# Patient Record
Sex: MALE | Race: WHITE | NOT HISPANIC OR LATINO | Employment: FULL TIME | URBAN - METROPOLITAN AREA
[De-identification: names, ages, dates, MRNs, and addresses within clinical notes are randomized per-mention and may not be internally consistent; named-entity substitution may affect disease eponyms.]

---

## 2019-12-19 ENCOUNTER — RECORDS - HEALTHEAST (OUTPATIENT)
Dept: LAB | Facility: CLINIC | Age: 24
End: 2019-12-19

## 2019-12-19 LAB
ALBUMIN SERPL-MCNC: 4.6 G/DL (ref 3.5–5)
ALP SERPL-CCNC: 67 U/L (ref 45–120)
ALT SERPL W P-5'-P-CCNC: 26 U/L (ref 0–45)
ANION GAP SERPL CALCULATED.3IONS-SCNC: 12 MMOL/L (ref 5–18)
AST SERPL W P-5'-P-CCNC: 17 U/L (ref 0–40)
BILIRUB SERPL-MCNC: 0.4 MG/DL (ref 0–1)
BUN SERPL-MCNC: 13 MG/DL (ref 8–22)
CALCIUM SERPL-MCNC: 10 MG/DL (ref 8.5–10.5)
CHLORIDE BLD-SCNC: 99 MMOL/L (ref 98–107)
CHOLEST SERPL-MCNC: 242 MG/DL
CO2 SERPL-SCNC: 28 MMOL/L (ref 22–31)
CREAT SERPL-MCNC: 1.05 MG/DL (ref 0.7–1.3)
FASTING STATUS PATIENT QL REPORTED: ABNORMAL
GFR SERPL CREATININE-BSD FRML MDRD: >60 ML/MIN/1.73M2
GLUCOSE BLD-MCNC: 110 MG/DL (ref 70–125)
HDLC SERPL-MCNC: 45 MG/DL
LDLC SERPL CALC-MCNC: 146 MG/DL
POTASSIUM BLD-SCNC: 3.5 MMOL/L (ref 3.5–5)
PROT SERPL-MCNC: 8 G/DL (ref 6–8)
SODIUM SERPL-SCNC: 139 MMOL/L (ref 136–145)
TRIGL SERPL-MCNC: 257 MG/DL

## 2021-01-07 ENCOUNTER — RECORDS - HEALTHEAST (OUTPATIENT)
Dept: LAB | Facility: CLINIC | Age: 26
End: 2021-01-07

## 2021-01-08 LAB — C TRACH DNA SPEC QL PROBE+SIG AMP: NEGATIVE

## 2021-05-31 ENCOUNTER — RECORDS - HEALTHEAST (OUTPATIENT)
Dept: ADMINISTRATIVE | Facility: CLINIC | Age: 26
End: 2021-05-31

## 2023-03-29 ENCOUNTER — LAB REQUISITION (OUTPATIENT)
Dept: LAB | Facility: CLINIC | Age: 28
End: 2023-03-29

## 2023-03-29 DIAGNOSIS — Z00.00 ENCOUNTER FOR GENERAL ADULT MEDICAL EXAMINATION WITHOUT ABNORMAL FINDINGS: ICD-10-CM

## 2023-03-29 LAB
ALBUMIN SERPL BCG-MCNC: 4.7 G/DL (ref 3.5–5.2)
ALP SERPL-CCNC: 61 U/L (ref 40–129)
ALT SERPL W P-5'-P-CCNC: 41 U/L (ref 10–50)
ANION GAP SERPL CALCULATED.3IONS-SCNC: 16 MMOL/L (ref 7–15)
AST SERPL W P-5'-P-CCNC: 19 U/L (ref 10–50)
BILIRUB SERPL-MCNC: 0.2 MG/DL
BUN SERPL-MCNC: 15.1 MG/DL (ref 6–20)
CALCIUM SERPL-MCNC: 10.2 MG/DL (ref 8.6–10)
CHLORIDE SERPL-SCNC: 101 MMOL/L (ref 98–107)
CHOLEST SERPL-MCNC: 270 MG/DL
CREAT SERPL-MCNC: 0.94 MG/DL (ref 0.67–1.17)
DEPRECATED HCO3 PLAS-SCNC: 24 MMOL/L (ref 22–29)
GFR SERPL CREATININE-BSD FRML MDRD: >90 ML/MIN/1.73M2
GLUCOSE SERPL-MCNC: 92 MG/DL (ref 70–99)
HBA1C MFR BLD: 5.7 %
HDLC SERPL-MCNC: 42 MG/DL
LDLC SERPL CALC-MCNC: 184 MG/DL
NONHDLC SERPL-MCNC: 228 MG/DL
POTASSIUM SERPL-SCNC: 4.3 MMOL/L (ref 3.4–5.3)
PROT SERPL-MCNC: 7.8 G/DL (ref 6.4–8.3)
SODIUM SERPL-SCNC: 141 MMOL/L (ref 136–145)
TRIGL SERPL-MCNC: 222 MG/DL

## 2023-03-29 PROCEDURE — 80061 LIPID PANEL: CPT | Performed by: FAMILY MEDICINE

## 2023-03-29 PROCEDURE — 80053 COMPREHEN METABOLIC PANEL: CPT | Performed by: FAMILY MEDICINE

## 2023-03-29 PROCEDURE — 83036 HEMOGLOBIN GLYCOSYLATED A1C: CPT | Performed by: FAMILY MEDICINE

## 2024-01-02 ENCOUNTER — HOSPITAL ENCOUNTER (EMERGENCY)
Facility: CLINIC | Age: 29
Discharge: HOME | End: 2024-01-02
Attending: EMERGENCY MEDICINE | Admitting: EMERGENCY MEDICINE
Payer: COMMERCIAL

## 2024-01-02 VITALS
SYSTOLIC BLOOD PRESSURE: 136 MMHG | DIASTOLIC BLOOD PRESSURE: 85 MMHG | OXYGEN SATURATION: 96 % | WEIGHT: 205 LBS | HEART RATE: 94 BPM | RESPIRATION RATE: 20 BRPM | HEIGHT: 69 IN | TEMPERATURE: 97.6 F | BODY MASS INDEX: 30.36 KG/M2

## 2024-01-02 DIAGNOSIS — M54.41 ACUTE BILATERAL LOW BACK PAIN WITH BILATERAL SCIATICA: ICD-10-CM

## 2024-01-02 DIAGNOSIS — M54.42 ACUTE BILATERAL LOW BACK PAIN WITH BILATERAL SCIATICA: ICD-10-CM

## 2024-01-02 PROCEDURE — 250N000013 HC RX MED GY IP 250 OP 250 PS 637: Performed by: EMERGENCY MEDICINE

## 2024-01-02 PROCEDURE — 99284 EMERGENCY DEPT VISIT MOD MDM: CPT | Performed by: EMERGENCY MEDICINE

## 2024-01-02 RX ORDER — IBUPROFEN 200 MG
800 TABLET ORAL EVERY 8 HOURS PRN
COMMUNITY
Start: 2024-01-02 | End: 2024-01-07

## 2024-01-02 RX ORDER — METHOCARBAMOL 750 MG/1
750-1500 TABLET, FILM COATED ORAL 4 TIMES DAILY PRN
Qty: 30 TABLET | Refills: 0 | Status: SHIPPED | OUTPATIENT
Start: 2024-01-02 | End: 2024-02-27

## 2024-01-02 RX ORDER — LIDOCAINE 50 MG/G
1 PATCH TOPICAL EVERY 24 HOURS
Qty: 10 PATCH | Refills: 0 | Status: SHIPPED | OUTPATIENT
Start: 2024-01-02 | End: 2024-01-12

## 2024-01-02 RX ORDER — LIDOCAINE 4 G/G
1 PATCH TOPICAL ONCE
Status: DISCONTINUED | OUTPATIENT
Start: 2024-01-02 | End: 2024-01-02 | Stop reason: HOSPADM

## 2024-01-02 RX ORDER — IBUPROFEN 400 MG/1
800 TABLET, FILM COATED ORAL ONCE
Status: COMPLETED | OUTPATIENT
Start: 2024-01-02 | End: 2024-01-02

## 2024-01-02 RX ORDER — ACETAMINOPHEN 500 MG
1000 TABLET ORAL EVERY 8 HOURS PRN
COMMUNITY
Start: 2024-01-02 | End: 2024-01-07

## 2024-01-02 RX ORDER — METHOCARBAMOL 750 MG/1
1500 TABLET, FILM COATED ORAL ONCE
Status: COMPLETED | OUTPATIENT
Start: 2024-01-02 | End: 2024-01-02

## 2024-01-02 RX ADMIN — LIDOCAINE 1 PATCH: 4 PATCH TOPICAL at 03:57

## 2024-01-02 RX ADMIN — IBUPROFEN 800 MG: 400 TABLET ORAL at 03:57

## 2024-01-02 RX ADMIN — METHOCARBAMOL 1500 MG: 750 TABLET ORAL at 03:57

## 2024-01-02 ASSESSMENT — ENCOUNTER SYMPTOMS
NECK STIFFNESS: 0
NUMBNESS: 1
FATIGUE: 0
APPETITE CHANGE: 0
CHILLS: 0
CHEST TIGHTNESS: 0
TREMORS: 0
SHORTNESS OF BREATH: 0
ABDOMINAL PAIN: 0
WOUND: 0
FEVER: 0
NECK PAIN: 0
BACK PAIN: 1
WEAKNESS: 0

## 2024-01-02 NOTE — DISCHARGE INSTRUCTIONS
Apply an ice pack wrapped in a cloth to the affected area for 20 minutes every hour (20 minutes on, 40 minutes off) as needed for pain or swelling
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
## 2024-01-02 NOTE — LETTER
1995      To Whom it may concern:    Wil Hankins was seen in our Emergency Department today, 01/02/24 for an injury to his back     The injury occurred on 12/26/23.  Diagnosis: Low back pain with sciatica.      For the next 2 days she should not work.    After returning the following restrictions apply until 1/16/24:  A) Bend: Not at all (0 hours)  B) Squat: Not at all (0 hours)  C) Walk/Stand: Occasionally (1-3 hours)  D) Reach above shoulders: Occasionally (1-3 hours)  E) Lift, carry, push and pull no more than: 11-20 lbs.    The employee might be taking medication so that they cannot operate moving machinery or perform activities that require balancing or working above ground.    Follow up: to be scheduled with his primary care provider in 1-2 weeks.    Sincerely,        Bertin De La Cruz MD

## 2024-01-12 ENCOUNTER — OFFICE VISIT (OUTPATIENT)
Dept: FAMILY MEDICINE | Facility: CLINIC | Age: 29
End: 2024-01-12
Attending: EMERGENCY MEDICINE
Payer: COMMERCIAL

## 2024-01-12 VITALS
HEIGHT: 70 IN | HEART RATE: 81 BPM | SYSTOLIC BLOOD PRESSURE: 123 MMHG | TEMPERATURE: 98.2 F | OXYGEN SATURATION: 98 % | WEIGHT: 207 LBS | RESPIRATION RATE: 20 BRPM | DIASTOLIC BLOOD PRESSURE: 76 MMHG | BODY MASS INDEX: 29.63 KG/M2

## 2024-01-12 DIAGNOSIS — M54.50 ACUTE BILATERAL LOW BACK PAIN WITHOUT SCIATICA: Primary | ICD-10-CM

## 2024-01-12 PROCEDURE — 99203 OFFICE O/P NEW LOW 30 MIN: CPT | Performed by: FAMILY MEDICINE

## 2024-01-12 RX ORDER — OMEGA-3/DHA/EPA/FISH OIL 60 MG-90MG
CAPSULE ORAL
COMMUNITY

## 2024-01-12 RX ORDER — NAPROXEN 500 MG/1
500 TABLET ORAL 2 TIMES DAILY WITH MEALS
Qty: 30 TABLET | Refills: 0 | Status: SHIPPED | OUTPATIENT
Start: 2024-01-12

## 2024-01-12 ASSESSMENT — PAIN SCALES - GENERAL: PAINLEVEL: MODERATE PAIN (5)

## 2024-01-12 NOTE — PROGRESS NOTES
Assessment & Plan     Acute bilateral low back pain without sciatica  Symptoms have been improving with conservative cares and Robaxin.  Discussed continuing conservative cares.  He is going to think about physical therapy because her insurance.  Utilize naproxen 500 mg twice daily as needed, Robaxin as needed.  Work note provided patient to follow-up on 1/23 for reevaluation as work is quite strenuous and unable to perform work duties at this time.   - Primary Care Referral  - naproxen (NAPROSYN) 500 MG tablet  Dispense: 30 tablet; Refill: 0    The risks, benefits and treatment options of prescribed medications or other treatments have been discussed with the patient. The patient verbalized their understanding and should call or follow up if no improvement or if they develop further problems.      Smith Coronado DO  [facility]    Jose De La Torre is a 28 year old, presenting for the following health issues:  ER F/U        1/12/2024     3:13 PM   Additional Questions   Roomed by Jenni CHARLES       ED/UC Followup:    Facility:  [facility] Emergency Dept  Date of visit: 1/2/2024  Reason for visit: Acute bilateral low back pain with bilateral sciatica  Current Status: getting better      Symptoms are getting better. He couldn't put his pants on during that time.   Pain localized to the lower back.   No radicular symptoms.   No leg weakness.   No loss of bowel or bladder dysfunction.   No perineum anesthesia.   No prior back surgeries.       Has been using muscle relaxants only when severe.   Pain improved with getting up and doing things.   Pain worsened with sitting upright.   Muscle relaxants do help.     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   Has not been back to work since his injury.    Review of Systems   Constitutional, HEENT, cardiovascular, pulmonary, gi and gu systems are negative, except as otherwise noted.      Objective    /76 (BP Location: Right arm,  "Patient Position: Chair, Cuff Size: Adult Regular)   Pulse 81   Temp 98.2  F (36.8  C) (Oral)   Resp 20   Ht 1.772 m (5' 9.75\")   Wt 93.9 kg (207 lb)   SpO2 98%   BMI 29.91 kg/m    Body mass index is 29.91 kg/m .  Physical Exam   General: alert, cooperative, no acute distress   CV: RRR, no murmur  Resp: non-labored breathing, clear to auscultation, no wheezing or rales   MSK back: No swelling erythema atrophy or deformity.  Nontender over the thoracic and lumbar spine.  Tissue texture hypertrophy and tenderness in the lower lumbar spine musculature bilaterally.  Range of motion is limited in all planes due to discomfort/pain.  Lower extremity strength is full.  Lower extremity sensation intact.  Straight leg raise testing negative.        "

## 2024-01-12 NOTE — PATIENT INSTRUCTIONS
Start using naproxen 500 mg twice daily as needed for the next 7-10 days.     Utilize methocarbamol as needed.     Consider physical therapy.

## 2024-01-14 ENCOUNTER — TELEPHONE (OUTPATIENT)
Dept: FAMILY MEDICINE | Facility: CLINIC | Age: 29
End: 2024-01-14
Payer: COMMERCIAL

## 2024-01-14 NOTE — TELEPHONE ENCOUNTER
[facility] medical request form received, prepared, and routed to Dr. Coronado along with 1/13/23 chart notes.

## 2024-01-17 NOTE — TELEPHONE ENCOUNTER
Form completed, signed, and faxed to [facility] [employer] at [phone] along with 1/14 office visit notes. Copy sent to scan and copy placed in cabinet.

## 2024-01-23 ENCOUNTER — OFFICE VISIT (OUTPATIENT)
Dept: FAMILY MEDICINE | Facility: CLINIC | Age: 29
End: 2024-01-23
Payer: COMMERCIAL

## 2024-01-23 VITALS
OXYGEN SATURATION: 97 % | HEIGHT: 70 IN | BODY MASS INDEX: 30.78 KG/M2 | RESPIRATION RATE: 20 BRPM | SYSTOLIC BLOOD PRESSURE: 127 MMHG | DIASTOLIC BLOOD PRESSURE: 78 MMHG | HEART RATE: 84 BPM | TEMPERATURE: 97.4 F | WEIGHT: 215 LBS

## 2024-01-23 DIAGNOSIS — M54.50 ACUTE LOW BACK PAIN WITHOUT SCIATICA, UNSPECIFIED BACK PAIN LATERALITY: Primary | ICD-10-CM

## 2024-01-23 PROCEDURE — 99213 OFFICE O/P EST LOW 20 MIN: CPT | Performed by: FAMILY MEDICINE

## 2024-01-23 ASSESSMENT — PAIN SCALES - GENERAL: PAINLEVEL: SEVERE PAIN (6)

## 2024-01-23 NOTE — PATIENT INSTRUCTIONS
Continue to use the naproxen as needed.     Continue with stretching and exercises at home.     Continue to use the heating pad.

## 2024-01-23 NOTE — LETTER
January 23, 2024      Wil Hankins  [address] [address] [id number]        To Whom It May Concern:    Wil Hankins was seen in our clinic. He may return to work without restrictions on 1/29/2023.       Sincerely,      No att. providers found

## 2024-01-23 NOTE — PROGRESS NOTES
Assessment & Plan     Acute low back pain without sciatica, unspecified back pain laterality  -- Continues to have symptoms from low back strain.  He reports his company was not able to accommodate with restrictions as he works as a .  His symptoms have been improving but are not fully resolved.  Discussed continued conservative cares.  Discussed physical therapy but patient reports he is not sure if this is covered by insurance but will look into this.  He will continue with home stretching and exercises.  Naproxen as needed.  -- Work note provided to return to work on 1/29 without restrictions.    The risks, benefits and treatment options of prescribed medications or other treatments have been discussed with the patient. The patient verbalized their understanding and should call or follow up if no improvement or if they develop further problems.        Jose De La Torre is a 28 year old, presenting for the following health issues:  Back Pain        1/23/2024    10:10 AM   Additional Questions   Roomed by Jenni JONES     History of Present Illness       Back Pain:  He presents for follow up of back pain. Patient's back pain is a recurring problem.  Location of back pain:  Right lower back, left lower back, right hip and left hip  Description of back pain: burning, cramping, sharp, shooting and stabbing  Back pain spreads: right buttocks, left buttocks, right thigh and left thigh    Since patient first noticed back pain, pain is: gradually improving  Does back pain interfere with his job:  Yes       He eats 2-3 servings of fruits and vegetables daily.He consumes 3 sweetened beverage(s) daily.He exercises with enough effort to increase his heart rate 30 to 60 minutes per day.  He exercises with enough effort to increase his heart rate 6 days per week.   He is taking medications regularly.       28 year old male who presents to clinic for follow up regarding back pain.   Seen in clinic on 1/12/2024, see note  "for full details.       Works as a .   He reported his company isn't able to accommodate for restrictions and so has not been working.   Stretching seems to be helping quite a bit for the pain.   No leg weakness   No loss of bowel or bladder dysfunction.   No perineum anesthesia.   No prior back surgeries.    He reports his symptoms have been improving with a mary chair and getting good stretch in.   Has been using naproxen which is helpful.   Has been using a heating pad which is also helpful.   No radicular symptoms.         Objective    /78 (BP Location: Right arm, Patient Position: Chair, Cuff Size: Adult Regular)   Pulse 84   Temp 97.4  F (36.3  C) (Oral)   Resp 20   Ht 1.772 m (5' 9.75\")   Wt 97.5 kg (215 lb)   SpO2 97%   BMI 31.07 kg/m    Body mass index is 31.07 kg/m .  Physical Exam   General: alert, cooperative, no acute distress   MSK back: non-tender over the thoracic and lumbar spine.  He has tissue texture hypertrophy of the lumbar paraspinal musculature.  Mild tenderness to palpation in this resgion.  Range of motion limited in forward flexion, extension, sidebending and rotation although improved from last visit 11 days ago.  Straight leg raise testing negative. Strength full. Sensation intact     Signed Electronically by: Smith Coronado DO    "

## 2024-02-27 ENCOUNTER — HOSPITAL ENCOUNTER (EMERGENCY)
Facility: CLINIC | Age: 29
Discharge: HOME | End: 2024-02-27
Attending: NURSE PRACTITIONER | Admitting: NURSE PRACTITIONER
Payer: COMMERCIAL

## 2024-02-27 VITALS
OXYGEN SATURATION: 98 % | DIASTOLIC BLOOD PRESSURE: 86 MMHG | WEIGHT: 215 LBS | BODY MASS INDEX: 31.84 KG/M2 | SYSTOLIC BLOOD PRESSURE: 133 MMHG | HEIGHT: 69 IN | RESPIRATION RATE: 18 BRPM | HEART RATE: 110 BPM | TEMPERATURE: 98.5 F

## 2024-02-27 DIAGNOSIS — M54.6 ACUTE MIDLINE THORACIC BACK PAIN: ICD-10-CM

## 2024-02-27 DIAGNOSIS — M54.41 ACUTE MIDLINE LOW BACK PAIN WITH RIGHT-SIDED SCIATICA: ICD-10-CM

## 2024-02-27 PROCEDURE — 99284 EMERGENCY DEPT VISIT MOD MDM: CPT | Performed by: NURSE PRACTITIONER

## 2024-02-27 RX ORDER — METHOCARBAMOL 500 MG/1
500-1000 TABLET, FILM COATED ORAL 4 TIMES DAILY PRN
Qty: 30 TABLET | Refills: 0 | Status: SHIPPED | OUTPATIENT
Start: 2024-02-27 | End: 2024-03-14

## 2024-02-27 RX ORDER — CELECOXIB 200 MG/1
200 CAPSULE ORAL 2 TIMES DAILY PRN
Qty: 40 CAPSULE | Refills: 0 | Status: SHIPPED | OUTPATIENT
Start: 2024-02-27

## 2024-02-27 RX ORDER — METHYLPREDNISOLONE 4 MG
TABLET, DOSE PACK ORAL
Qty: 21 TABLET | Refills: 0 | Status: SHIPPED | OUTPATIENT
Start: 2024-02-27 | End: 2024-03-14

## 2024-02-27 ASSESSMENT — COLUMBIA-SUICIDE SEVERITY RATING SCALE - C-SSRS
2. HAVE YOU ACTUALLY HAD ANY THOUGHTS OF KILLING YOURSELF IN THE PAST MONTH?: NO
1. IN THE PAST MONTH, HAVE YOU WISHED YOU WERE DEAD OR WISHED YOU COULD GO TO SLEEP AND NOT WAKE UP?: NO
6. HAVE YOU EVER DONE ANYTHING, STARTED TO DO ANYTHING, OR PREPARED TO DO ANYTHING TO END YOUR LIFE?: NO

## 2024-02-27 ASSESSMENT — ACTIVITIES OF DAILY LIVING (ADL): ADLS_ACUITY_SCORE: 33

## 2024-02-27 NOTE — Clinical Note
Wil Hankins was seen and treated in our emergency department on 2/27/2024.  He may return to work on 03/11/2024.       If you have any questions or concerns, please don't hesitate to call.      Rae Mckenzie APRN CNP

## 2024-02-27 NOTE — DISCHARGE INSTRUCTIONS
I recommend starting the Medrol Dosepak today.  Take daily as directed.  I recommend taking this with food.  Potential side effects can be increased appetite, increased  energy and the goal is to have decreased inflammation and pain in your low back.  Additionally, I have prescribed some Robaxin.  You can take 1 or 2 tablets by mouth up to 4 times daily.  This may cause drowsiness.  Please do not take this and work.  When the Medrol Dosepak is finished I have prescribed some Celebrex.  You can take 1 tablet by mouth twice daily as needed for pain this is an anti-inflammatory medication should be taken with food.  Potential side effects of Medrol Dosepak and Celebrex is gastrointestinal bleeding.  While you are taking the Medrol Dosepak, you may also take Tylenol 1000 mg up to 4 times daily as needed for pain management.  I have placed a referral for orthopedic spine.  They should call you within the next 48 hours.  If they have not called you, then I recommend calling them.  The phone number is on your discharge paperwork.  Please return to the emergency room if you should develop loss of bowel or bladder function.  A note has been provided for you to be off work.  Thank you for coming in today.

## 2024-02-27 NOTE — ED TRIAGE NOTES
Pt here with mid back pain stabbing into spine and intermittent groin pain. Pt has a known back injury since christmas. Pt has been back to work for one month. Pain started past few days, groin pain last night. Pt said he has similar groin pain earlier with his back injury. No numbness and or tingling.      Triage Assessment (Adult)       Row Name 02/27/24 1312          Triage Assessment    Airway WDL WDL        Respiratory WDL    Respiratory WDL WDL        Cardiac WDL    Cardiac WDL WDL        Cognitive/Neuro/Behavioral WDL    Cognitive/Neuro/Behavioral WDL WDL
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
## 2024-02-29 ENCOUNTER — OFFICE VISIT (OUTPATIENT)
Dept: PHYSICAL MEDICINE AND REHAB | Facility: CLINIC | Age: 29
End: 2024-02-29
Payer: COMMERCIAL

## 2024-02-29 VITALS
SYSTOLIC BLOOD PRESSURE: 136 MMHG | HEIGHT: 69 IN | HEART RATE: 87 BPM | WEIGHT: 208 LBS | DIASTOLIC BLOOD PRESSURE: 85 MMHG | BODY MASS INDEX: 30.81 KG/M2

## 2024-02-29 DIAGNOSIS — M54.41 ACUTE MIDLINE LOW BACK PAIN WITH RIGHT-SIDED SCIATICA: ICD-10-CM

## 2024-02-29 DIAGNOSIS — M54.16 LUMBAR RADICULOPATHY: ICD-10-CM

## 2024-02-29 DIAGNOSIS — R32 URINARY INCONTINENCE, UNSPECIFIED TYPE: Primary | ICD-10-CM

## 2024-02-29 DIAGNOSIS — M54.6 ACUTE MIDLINE THORACIC BACK PAIN: ICD-10-CM

## 2024-02-29 DIAGNOSIS — R20.0 BILATERAL LEG NUMBNESS: ICD-10-CM

## 2024-02-29 PROCEDURE — 99204 OFFICE O/P NEW MOD 45 MIN: CPT | Performed by: NURSE PRACTITIONER

## 2024-02-29 NOTE — LETTER
February 29, 2024      Wil Hankins  [address] [id number]        To Whom It May Concern:    Wil Hankins was seen in our clinic. He may return to work with the following restrictions: no lifting greater than 10 lbs. Avoid repetitive bending, twisting, pushing, pulling. Restrictions effective until imaging results available. Please feel free to contact our office with questions or concerns.      Sincerely,      Kasia BARAJAS  [facility]  O. [phone]
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
## 2024-02-29 NOTE — PATIENT INSTRUCTIONS
"Radicular Pain    Radicular pain in either the arm or leg is usually from a bulging disc in the spine. A portion of the herniated disc may press against the nerves as the nerves exit the spine. This causes pain which is felt down the arm or leg. Other causes of radicular pain may include:  Fractures.  Heart disease.  Cancer.  An abnormal and usually degenerative state of the nervous system or nerves (neuropathy).    In most cases, radicular pain is treated without imaging unless symptoms do not start to improve. If that is the case, your provider may order a CT or MRI scan to determine the cause.     Nerves in the cervical spine (neck) may cause radicular pain into the outer shoulder and down the arm. It can spread down to the thumb and fingers. The symptoms vary depending on which nerve root has been affected. In most cases, radicular pain improves with conservative treatment such as physical therapy, cervical traction, medications, and epidural steroid injections. A program for neck rehabilitation with stretching and strengthening exercises is an important part of management. Treatment may take months, and surgery may be considered as a last resort if the symptoms do not improve.    Nerves in the lumbar spine (lower back) may cause radicular pain into the hip and down the leg. The commonly used term for this type of pain is \"sciatica\". Conservative treatment is also recommended for this problem. Most patients feel better after 2 to 4 weeks of rest and other supportive care. You should avoid bending, lifting, and all other activities which can make your pain worse. Physical therapy, traction, medications, and epidural steroid injections can be good options to help with your recovery. A program for back injury rehabilitation with stretching and strengthening exercises is an important part of management. Surgery may be considered as a last resort if symptoms do not improve with conservative treatment.     You may " take over-the-counter or prescription medicines for pain, discomfort, or fever as directed by your caregiver. Muscle relaxants may help by relieving more severe pain and spasm. Neuropathic medication (such as gabapentin, lyrica, or cymbalta) can help decrease your symptoms of nerve pain as well. Cold or massage can also give significant relief. Spinal manipulation is not recommended as it can increase the degree of disc protrusion. We do not recommend taking narcotic medication such as percocet, oxycodone, norco, dilaudid, or others unless pain is severe and not controlled with any other oral options.    Epidural steroid injections are often effective treatment for radicular pain. These injections deliver strong anti-inflammatory medicine to the area directly around the nerve root in the space between your back bones (vertebrae). Your provider can give you more information about steroid injections. These injections are most effective when given within two weeks of the onset of acute pain. You should see your provider for follow up care as recommended.     In most cases, radicular pain is treated without imaging unless symptoms do not start to improve. If that is the case, your provider may order a CT or MRI scan to determine the cause.     SEEK IMMEDIATE MEDICAL CARE IF:  You develop increased pain, weakness, or numbness in your arm or leg.  You develop difficulty with bladder or bowel control.  You develop abdominal pain.    Document Released: 01/25/2006 Document Revised: 03/11/2013 Document Reviewed: 04/12/2010  Patient Information  2013 [employer].       Imaging (Xray, CT, or MRI) has been ordered today.   Radiology will call you to schedule. Please call below if you do not hear from them in the next couple of days.     [facility] Radiology Scheduling:  Please call [phone] to schedule your image(s) (select option #1).    There are 3 different locations:    [place] [address]  [facility] [address]    [facility]  [address]   [facility] [address]    [facility]  [address] [address]       ~You have been referred for Physical Therapy to [facility]. They will call you to schedule an appointment.      Scheduling phone number is [phone] for [facility], [place], or [place] location.  If you have not heard from the scheduling office within 2 business days, please call [phone] for ALL other locations.    Discussed the importance of core strengthening, ROM, stretching exercises and how each of these entities is important in decreasing pain and improving long term spine health.  The purpose of physical therapy is to teach you an individualized home exercise program.  These exercises need to be performed every day in order to decrease pain and prevent future occurrences of pain.           Continue your current medications.      ~Please call our [facility] Nurse Navigation line [phone] with any questions or concerns about your treatment plan, if symptoms worsen and you would like to be seen urgently, or if you have any new or worsening numbness, weakness, or problems controlling bladder and bowel function.  ~You are also welcome to contact Kasia Galloway via [employer], but please be aware that responses to [employer] message may take 2-3 days due to the high volume of patients seen in clinic.

## 2024-03-02 ENCOUNTER — HEALTH MAINTENANCE LETTER (OUTPATIENT)
Age: 29
End: 2024-03-02

## 2024-03-08 ENCOUNTER — HOSPITAL ENCOUNTER (OUTPATIENT)
Dept: MRI IMAGING | Facility: CLINIC | Age: 29
Discharge: HOME | End: 2024-03-08
Attending: NURSE PRACTITIONER | Admitting: NURSE PRACTITIONER
Payer: COMMERCIAL

## 2024-03-08 DIAGNOSIS — R32 URINARY INCONTINENCE, UNSPECIFIED TYPE: ICD-10-CM

## 2024-03-08 DIAGNOSIS — R20.0 BILATERAL LEG NUMBNESS: ICD-10-CM

## 2024-03-08 PROCEDURE — 72148 MRI LUMBAR SPINE W/O DYE: CPT

## 2024-03-12 ENCOUNTER — TELEPHONE (OUTPATIENT)
Dept: PHYSICAL MEDICINE AND REHAB | Facility: CLINIC | Age: 29
End: 2024-03-12
Payer: COMMERCIAL

## 2024-03-12 NOTE — TELEPHONE ENCOUNTER
Sent ACE message with results and recommendations. Provided numbers for [facility] and scheduling.

## 2024-03-12 NOTE — TELEPHONE ENCOUNTER
----- Message from STACEY Prado CNP sent at 3/12/2024  8:57 AM CDT -----  Please let Wil know that he has a small disc bulge at L4-5 towards the right which is slightly contacting the right L5 nerve. It is possible this is causing his leg symptoms, but I think the numbness in the groin/testicle and urinary symptoms are out of proportion to these findings and I would recommend he follow up with his PCP to rule out other causes such as a hernia. If his leg symptoms have improved with the medications, I would recommend a full course of PT. He can return to review his MRI imaging and discuss options if he'd like

## 2024-03-14 ENCOUNTER — OFFICE VISIT (OUTPATIENT)
Dept: PHYSICAL MEDICINE AND REHAB | Facility: CLINIC | Age: 29
End: 2024-03-14
Payer: COMMERCIAL

## 2024-03-14 DIAGNOSIS — R20.0 NUMBNESS: ICD-10-CM

## 2024-03-14 DIAGNOSIS — M54.16 LUMBAR RADICULOPATHY: ICD-10-CM

## 2024-03-14 DIAGNOSIS — R19.8 ALTERATION IN BOWEL AND BLADDER FUNCTION: Primary | ICD-10-CM

## 2024-03-14 DIAGNOSIS — R39.89 ALTERATION IN BOWEL AND BLADDER FUNCTION: Primary | ICD-10-CM

## 2024-03-14 DIAGNOSIS — M54.6 ACUTE MIDLINE THORACIC BACK PAIN: ICD-10-CM

## 2024-03-14 PROCEDURE — 99213 OFFICE O/P EST LOW 20 MIN: CPT | Performed by: NURSE PRACTITIONER

## 2024-03-14 RX ORDER — METHOCARBAMOL 500 MG/1
500-1000 TABLET, FILM COATED ORAL 4 TIMES DAILY PRN
Qty: 40 TABLET | Refills: 0 | Status: SHIPPED | OUTPATIENT
Start: 2024-03-14 | End: 2024-09-25

## 2024-03-14 ASSESSMENT — PAIN SCALES - GENERAL: PAINLEVEL: MODERATE PAIN (4)
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
## 2024-03-14 NOTE — LETTER
March 14, 2024      Wil Hankins  [address] [facility] [address]      To Whom It May Concern:     Wil Hankins was seen in our clinic. He may return to work with the following restrictions: no lifting greater than 10 lbs. Avoid repetitive bending, twisting, pushing, pulling. Restrictions effective until additional imaging results available and until sees PCP for further evaluation at the end of March. Please feel free to contact our office with questions or concerns.         Sincerely,        Kasia BARAJAS  [facility]  O. [phone]

## 2024-03-14 NOTE — PATIENT INSTRUCTIONS
Imaging (thoracic MRI) has been ordered today.   Radiology will call you to schedule. Please call below if you do not hear from them in the next couple of days.     [facility] Scheduling:  Please call [phone] to schedule your image(s) (select option #1).    There are 3 different locations:    [facility]  [address] [address]    [facility] [place]  [address], [address]   [place] [address]    [facility]  [address] [address]       Refilled Robaxin 500-100mg four times daily as needed  Continue your celebrex as needed      ~You have been (previously) referred for Physical Therapy to [facility]. Please call to schedule your appt. Because you were previously referred and the appt was canceled.     Scheduling phone number is [phone] [facility] [place], [place], or [place] location.  If you have not heard from the scheduling office within 2 business days, please call [phone] for ALL other locations.    Discussed the importance of core strengthening, ROM, stretching exercises and how each of these entities is important in decreasing pain and improving long term spine health.  The purpose of physical therapy is to teach you an individualized home exercise program.  These exercises need to be performed every day in order to decrease pain and prevent future occurrences of pain.             ~Please call our [facility] Nurse Navigation line [phone] with any questions or concerns about your treatment plan, if symptoms worsen and you would like to be seen urgently, or if you have any new or worsening numbness, weakness, or problems controlling bladder and bowel function.  ~You are also welcome to contact Kasia Galloway via [employer], but please be aware that responses to [employer] message may take 2-3 days due to the high volume of patients seen in  clinic.

## 2024-03-19 ENCOUNTER — THERAPY VISIT (OUTPATIENT)
Dept: PHYSICAL THERAPY | Facility: CLINIC | Age: 29
End: 2024-03-19
Attending: NURSE PRACTITIONER
Payer: COMMERCIAL

## 2024-03-19 DIAGNOSIS — M54.16 LUMBAR RADICULOPATHY: ICD-10-CM

## 2024-03-19 DIAGNOSIS — M54.6 ACUTE MIDLINE THORACIC BACK PAIN: ICD-10-CM

## 2024-03-19 PROCEDURE — 97162 PT EVAL MOD COMPLEX 30 MIN: CPT | Mod: GP | Performed by: PHYSICAL THERAPIST

## 2024-03-19 PROCEDURE — 97110 THERAPEUTIC EXERCISES: CPT | Mod: GP | Performed by: PHYSICAL THERAPIST

## 2024-03-19 NOTE — PROGRESS NOTES
PHYSICAL THERAPY EVALUATION  Type of Visit: Evaluation    See electronic medical record for Abuse and Falls Screening details.    Subjective       Presenting condition or subjective complaint: Bulging disk.   Pt notes he developed LBP when pulling to start a 4 donahue 12/26/24.  Pt noted increasing symptoms and went to ER 2/27/24.  Currently L > R LBP intermittent 7/10. Pt notes intermittent burning to numbness  to B thighs.   + cough.  Pt notes it will feel like he needs to urinate but it can take up to 5 min to actually go.  No loss of control.   Pt notes B LE felt weak w/ bending over to clean the tub.  Meds:   mm relaxers prn. Celebrex.  Pt has  inversion table but has not used yet as he was missing a part.  Pt will be seeing MD re: possible hernia 3/27/24.   Worse : sitting especially on hard surface--immediate discomfort.  Sitting on padded chair 10 min tolerance Bending over to clean the tub.   Lifting > 20#.  Difficulty getting comfortable to sleep 1X/week.    Better:  heat, lying supine.    Date of onset:      Relevant medical history:   unremarkable.  Pt notes intermittent dizziness.    Dates & types of surgery:      Prior diagnostic imaging/testing results: MRI                                      IMPRESSION:  1.  Small, broad-based central and right paracentral L4-L5 disc protrusion.    Pt to have thoracic MRI 3/22/24.    Prior therapy history for the same diagnosis, illness or injury: No      Prior Level of Function  Transfers: Independent  Ambulation: Independent  ADL: Independent  IADL: Work    Living Environment  Social support: With a significant other or spouse   Type of home: House   Stairs to enter the home: Yes 3 Is there a railing: No   Ramp: No   Stairs inside the home: Yes 12 Is there a railing: Yes   Help at home: None  Equipment owned:       Employment: Yes .  Off work currently.  Hobbies/Interests: Fishing, working on cars, gardening, playing with my son, antoni    Patient goals  for therapy: Bend and twist repeatedly.  Lift things again         Objective   LUMBAR SPINE EVALUATION    POSTURE: slight FB posture,  decreased lumbar lordosis.  PSIS/ crest level.  Mild swelling R LB  GAIT: Sit to stand relies on UE assist.    No limp,  stiff/guarded.  Able to heel/ toe walk but notes discomfort w/ heel walking.      ROM:   (Degrees) Left AROM Left PROM  Right AROM Right PROM   Hip Flexion       Hip Extension       Hip Abduction       Hip Adduction       Hip Internal Rotation  20  18   Hip External Rotation  35  35   Knee Flexion       Knee Extension       Lumbar Side bend 50%  (mid thigh) 50%  (mid thigh)   Lumbar Flexion 25% w/ LBP   Lumbar Extension 20% notes tightness   Pain:   End feel:   PELVIC/SI SCREEN: Neg FB test.  L Scour w/ ER and KYLE caused R LBP,  R neg.   B FADIR neg.    STRENGTH: B hip flex/ quads/ HS / ankle DF 5/5,  1st toe ext B 5-/5.  Hip abd R 4/5, L5/5  NEURAL TENSION: slump L > R +  FLEXIBILITY: Thai neg WFL but L caused R LBP.  SLR mod + tightness B no back complaints  LUMBAR/HIP Special Tests: Guarded  w/ bed mobility.  Prone lying 3-4/10 , mid thoracic 3/10.  KERLINE:  feels a little better.   PPU 75%, no complaints  PALPATION: Mild tenderness B upper to mid thoracic paraspinals, Mod tenderness L4/5 lumbar paraspinals / B QL/  B psoas and iliacus.  SPINAL SEGMENTAL CONCLUSIONS: Unable to fully assess ERS/ FRS due to limited mobility    Assessment & Plan   CLINICAL IMPRESSIONS  Medical Diagnosis: Lumbar radiculopathy    Treatment Diagnosis: lumbar radiculopathy   Impression/Assessment: Patient is a 28 year old male with LBP w/ radicular  complaints.  The following significant findings have been identified: Pain, Decreased ROM/flexibility, Decreased joint mobility, Decreased strength, Impaired muscle performance, and Decreased activity tolerance. These impairments interfere with their ability to perform self care tasks, work tasks, recreational activities, and household  chores as compared to previous level of function.     Clinical Decision Making (Complexity):  Clinical Presentation: Evolving/Changing  Clinical Presentation Rationale: based on medical and personal factors listed in PT evaluation  Clinical Decision Making (Complexity): Moderate complexity    PLAN OF CARE  Treatment Interventions:  Modalities: E-stim--tens unit  Interventions: Manual Therapy, Neuromuscular Re-education, Therapeutic Activity, Therapeutic Exercise    Long Term Goals     PT Goal 1  Goal Identifier: 1.  Goal Description: Pt will be able to sit X 30 min w/ LBP no > 2/10  Target Date: 04/16/24  PT Goal 2  Goal Identifier: 2.  Goal Description: Pt will be able to bend w/ ADL's / light cleaning tasks w/ LBP no > 2/10  Target Date: 05/14/24  PT Goal 3  Goal Identifier: 3.  Goal Description: Pt will be able to lift/ carry 40-50# w/ LBP no > 2/10 for household and work tasks  Target Date: 05/14/24  PT Goal 4  Goal Identifier: 4.  Goal Description: Pt will be independent and consistent w/ HEP to manage symptoms and have increased awareness of body mechanics  Target Date: 05/14/24      Frequency of Treatment: 1X week  Duration of Treatment: 8 weeks    Recommended Referrals to Other Professionals:  none  Education Assessment:   Learner/Method: Patient;Listening;Reading;Demonstration;Pictures/Video;No Barriers to Learning    Risks and benefits of evaluation/treatment have been explained.   Patient/Family/caregiver agrees with Plan of Care.     Evaluation Time:     PT Eval, Moderate Complexity Minutes (91299): 30       Signing Clinician: Joanne Colvin, PT

## 2024-03-22 ENCOUNTER — TELEPHONE (OUTPATIENT)
Dept: PHYSICAL MEDICINE AND REHAB | Facility: CLINIC | Age: 29
End: 2024-03-22
Payer: COMMERCIAL

## 2024-03-22 ENCOUNTER — HOSPITAL ENCOUNTER (OUTPATIENT)
Dept: MRI IMAGING | Facility: CLINIC | Age: 29
Discharge: HOME | End: 2024-03-22
Attending: NURSE PRACTITIONER | Admitting: NURSE PRACTITIONER
Payer: COMMERCIAL

## 2024-03-22 DIAGNOSIS — R39.89 ALTERATION IN BOWEL AND BLADDER FUNCTION: ICD-10-CM

## 2024-03-22 DIAGNOSIS — R19.8 ALTERATION IN BOWEL AND BLADDER FUNCTION: ICD-10-CM

## 2024-03-22 DIAGNOSIS — R20.0 NUMBNESS: ICD-10-CM

## 2024-03-22 DIAGNOSIS — M54.6 ACUTE MIDLINE THORACIC BACK PAIN: ICD-10-CM

## 2024-03-22 PROCEDURE — 72146 MRI CHEST SPINE W/O DYE: CPT

## 2024-03-22 NOTE — TELEPHONE ENCOUNTER
----- Message from STACEY Prado CNP sent at 3/22/2024  3:44 PM CDT -----  Please let Wil know that I reviewed his thoracic MRI and it is normal. He has some mild wear and tear degenerative changes. No narrowing around the spinal cord or disc herniations. No findings to help explain his urinary symptoms at this time.

## 2024-03-22 NOTE — TELEPHONE ENCOUNTER
Phone call to patient to review results. Results given and explained. Informed him that there are no findings to help explain his urinary symptoms at this time.     He reports he will continue with PT as they have some idea/options for him.

## 2024-03-25 ENCOUNTER — THERAPY VISIT (OUTPATIENT)
Dept: PHYSICAL THERAPY | Facility: CLINIC | Age: 29
End: 2024-03-25
Attending: NURSE PRACTITIONER
Payer: COMMERCIAL

## 2024-03-25 DIAGNOSIS — M62.89 PELVIC FLOOR DYSFUNCTION: Primary | ICD-10-CM

## 2024-03-25 DIAGNOSIS — K59.00 CONSTIPATION, UNSPECIFIED CONSTIPATION TYPE: ICD-10-CM

## 2024-03-25 DIAGNOSIS — M54.16 LUMBAR RADICULOPATHY: Primary | ICD-10-CM

## 2024-03-25 PROCEDURE — 97535 SELF CARE MNGMENT TRAINING: CPT | Mod: GP | Performed by: PHYSICAL THERAPIST

## 2024-03-25 PROCEDURE — 97112 NEUROMUSCULAR REEDUCATION: CPT | Mod: GP | Performed by: PHYSICAL THERAPIST

## 2024-03-25 PROCEDURE — 97110 THERAPEUTIC EXERCISES: CPT | Mod: GP | Performed by: PHYSICAL THERAPIST

## 2024-03-25 PROCEDURE — 999N000104 HC STATISTIC NO CHARGE: Performed by: PHYSICAL THERAPIST

## 2024-03-25 PROCEDURE — 97530 THERAPEUTIC ACTIVITIES: CPT | Mod: GP | Performed by: PHYSICAL THERAPIST

## 2024-03-26 ENCOUNTER — THERAPY VISIT (OUTPATIENT)
Dept: PHYSICAL THERAPY | Facility: CLINIC | Age: 29
End: 2024-03-26
Attending: NURSE PRACTITIONER
Payer: COMMERCIAL

## 2024-03-26 DIAGNOSIS — M54.16 LUMBAR RADICULOPATHY: Primary | ICD-10-CM

## 2024-03-26 PROCEDURE — 97110 THERAPEUTIC EXERCISES: CPT | Mod: GP | Performed by: PHYSICAL THERAPIST

## 2024-03-27 ENCOUNTER — OFFICE VISIT (OUTPATIENT)
Dept: FAMILY MEDICINE | Facility: CLINIC | Age: 29
End: 2024-03-27
Payer: COMMERCIAL

## 2024-03-27 ENCOUNTER — TELEPHONE (OUTPATIENT)
Dept: NEUROSURGERY | Facility: CLINIC | Age: 29
End: 2024-03-27

## 2024-03-27 VITALS
HEART RATE: 78 BPM | SYSTOLIC BLOOD PRESSURE: 110 MMHG | RESPIRATION RATE: 16 BRPM | WEIGHT: 215.3 LBS | OXYGEN SATURATION: 98 % | TEMPERATURE: 97.6 F | BODY MASS INDEX: 31.79 KG/M2 | DIASTOLIC BLOOD PRESSURE: 72 MMHG

## 2024-03-27 DIAGNOSIS — R10.31 RIGHT INGUINAL PAIN: Primary | ICD-10-CM

## 2024-03-27 PROCEDURE — 99213 OFFICE O/P EST LOW 20 MIN: CPT | Performed by: NURSE PRACTITIONER

## 2024-03-27 ASSESSMENT — PAIN SCALES - GENERAL: PAINLEVEL: NO PAIN (0)

## 2024-03-27 NOTE — PROGRESS NOTES
Assessment & Plan     Right inguinal pain  -pain resolved now  -exam is normal, negative for hernia  -patient was concern about possible inguinal hernia, I advised him about normal exam, if he develop symptoms again will consider hernia US for evaluation of possible occult hernia       Jose De La Torre is a 28 year old, presenting for the following health issues:  Hernia        3/27/2024     7:31 AM   Additional Questions   Roomed by lisa   Accompanied by self         3/27/2024     7:31 AM   Patient Reported Additional Medications   Patient reports taking the following new medications none     History of Present Illness       Reason for visit:  Check for hermia    He eats 2-3 servings of fruits and vegetables daily.He consumes 2 sweetened beverage(s) daily.He exercises with enough effort to increase his heart rate 20 to 29 minutes per day.  He exercises with enough effort to increase his heart rate 5 days per week.   He is taking medications regularly.         ED/UC Followup:    Facility:  [facility]   Date of visit: 2/27/24   Reason for visit: back pain   Current Status: has been doing physical therapy, back pain improved. Had pelvic and groin pain which resolved now, was worry about possible hernia       Review of Systems  Constitutional, HEENT, cardiovascular, pulmonary, gi and gu systems are negative, except as otherwise noted.      Objective    /72   Pulse 78   Temp 97.6  F (36.4  C) (Tympanic)   Resp 16   Wt 97.7 kg (215 lb 4.8 oz)   SpO2 98%   BMI 31.79 kg/m    Body mass index is 31.79 kg/m .  Physical Exam   GENERAL: alert and no distress  EYES: Eyes grossly normal to inspection, PERRL and conjunctivae and sclerae normal  ABDOMEN: soft, nontender and no organomegaly or masses   (male): testicles normal without atrophy or masses, no hernias, and penis normal without urethral discharge  SKIN: no suspicious lesions or rashes  PSYCH: mentation appears normal, affect normal/bright             Signed Electronically by: STACEY Chen CNP

## 2024-03-27 NOTE — TELEPHONE ENCOUNTER
Form or Letter   Type or form/letter needing completion: needs a  note excusing him until the end of April for PT appts  Provider: Kasia Galloway  Date form needed: asap  Once completed: send to [employer]    Could we send this information to you in [employer] or would you prefer to receive a phone call?:   No preference   Okay to leave a detailed message?: No at Cell number on file:    Telephone Information:   Mobile [phone]

## 2024-03-27 NOTE — TELEPHONE ENCOUNTER
Phone call to patient to inform him the letter he requested has been written and is in MyChart. Stated understanding and appreciation for call back.

## 2024-04-05 ENCOUNTER — THERAPY VISIT (OUTPATIENT)
Dept: PHYSICAL THERAPY | Facility: CLINIC | Age: 29
End: 2024-04-05
Attending: NURSE PRACTITIONER
Payer: COMMERCIAL

## 2024-04-05 DIAGNOSIS — M54.16 LUMBAR RADICULOPATHY: Primary | ICD-10-CM

## 2024-04-05 PROCEDURE — 97110 THERAPEUTIC EXERCISES: CPT | Mod: GP | Performed by: PHYSICAL THERAPIST

## 2024-04-10 ENCOUNTER — THERAPY VISIT (OUTPATIENT)
Dept: PHYSICAL THERAPY | Facility: CLINIC | Age: 29
End: 2024-04-10
Attending: NURSE PRACTITIONER
Payer: COMMERCIAL

## 2024-04-10 DIAGNOSIS — M54.16 LUMBAR RADICULOPATHY: Primary | ICD-10-CM

## 2024-04-10 PROBLEM — K59.00 CONSTIPATION: Status: ACTIVE | Noted: 2024-04-10

## 2024-04-10 PROCEDURE — 97110 THERAPEUTIC EXERCISES: CPT | Mod: GP | Performed by: PHYSICAL THERAPIST

## 2024-04-17 ENCOUNTER — THERAPY VISIT (OUTPATIENT)
Dept: PHYSICAL THERAPY | Facility: CLINIC | Age: 29
End: 2024-04-17
Attending: NURSE PRACTITIONER
Payer: COMMERCIAL

## 2024-04-17 DIAGNOSIS — M54.16 LUMBAR RADICULOPATHY: Primary | ICD-10-CM

## 2024-04-17 PROCEDURE — 97110 THERAPEUTIC EXERCISES: CPT | Mod: GP | Performed by: PHYSICAL THERAPIST

## 2024-04-17 PROCEDURE — 97530 THERAPEUTIC ACTIVITIES: CPT | Mod: GP | Performed by: PHYSICAL THERAPIST

## 2024-04-18 ENCOUNTER — TELEPHONE (OUTPATIENT)
Dept: FAMILY MEDICINE | Facility: CLINIC | Age: 29
End: 2024-04-18
Payer: COMMERCIAL

## 2024-04-19 NOTE — TELEPHONE ENCOUNTER
[employer] benefit - medical request form prepared and routed to Dr. Coronado to hold for 4/29 appt

## 2024-04-24 ENCOUNTER — THERAPY VISIT (OUTPATIENT)
Dept: PHYSICAL THERAPY | Facility: CLINIC | Age: 29
End: 2024-04-24
Attending: NURSE PRACTITIONER
Payer: COMMERCIAL

## 2024-04-24 DIAGNOSIS — M54.16 LUMBAR RADICULOPATHY: Primary | ICD-10-CM

## 2024-04-24 PROCEDURE — 97110 THERAPEUTIC EXERCISES: CPT | Mod: GP | Performed by: PHYSICAL THERAPIST

## 2024-04-26 ENCOUNTER — OFFICE VISIT (OUTPATIENT)
Dept: NEUROSURGERY | Facility: CLINIC | Age: 29
End: 2024-04-26
Payer: COMMERCIAL

## 2024-04-26 VITALS — OXYGEN SATURATION: 98 % | HEART RATE: 74 BPM | SYSTOLIC BLOOD PRESSURE: 125 MMHG | DIASTOLIC BLOOD PRESSURE: 87 MMHG

## 2024-04-26 DIAGNOSIS — M54.16 LUMBAR RADICULOPATHY: Primary | ICD-10-CM

## 2024-04-26 PROCEDURE — 99214 OFFICE O/P EST MOD 30 MIN: CPT | Performed by: NURSE PRACTITIONER

## 2024-04-26 RX ORDER — METHOCARBAMOL 500 MG/1
500-1000 TABLET, FILM COATED ORAL 4 TIMES DAILY PRN
Qty: 40 TABLET | Refills: 0 | Status: SHIPPED | OUTPATIENT
Start: 2024-04-26

## 2024-04-26 RX ORDER — METHYLPREDNISOLONE 4 MG
TABLET, DOSE PACK ORAL
Qty: 21 TABLET | Refills: 0 | Status: SHIPPED | OUTPATIENT
Start: 2024-04-26

## 2024-04-26 ASSESSMENT — PAIN SCALES - GENERAL: PAINLEVEL: SEVERE PAIN (7)
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
## 2024-04-26 NOTE — PATIENT INSTRUCTIONS
An injection has been ordered today to potentially help with your pain symptoms. These injections do not fix what is going on in your back, therefore they typically do not take away the pain completely, however they can many times help improve symptoms. Injections should always be completed along with other modalities such as physical therapy for the best long term outcomes. If injections alone are done, then pain will likely return.     [facility] Injection Requirements:    A  is required for all fluoroscopically-guided injections.  Injection appointments may be cancelled if there are signs/symptoms of an active infection or if the patient is being actively treated with antibiotics for a diagnosed infection.  Patients may have their steroid injection cancelled if they have had another steroid injection within 2 weeks.  Diabetic patients will have their blood glucose levels checked the day of their injection and the appointment will be rescheduled if the blood glucose level is 300 or higher.  Patients with allergies to cortisone, local anesthetics, iodine, or contrast dye should contact the Spine Center to further discuss these considerations.  Patients scheduled for medial branch block diagnostic injections should refrain from taking pain medication the day of the procedure.  The medial branch block injection appointment will be rescheduled if the patient's pain rating is not 5/10 or greater at the time of the procedure.  Patients taking warfarin/Coumadin will have their INR checked the day of the procedure and the procedure may be rescheduled if the INR is greater than 3.0.  Please contact the Spine Center [phone]) if you are taking any prescription blood-thinning medications (warfarin, Plavix, Lovenox, Eliquis, Brilinta, Effient, etc.) as special dosing adjustments may need to be made depending on the type of injection you are scheduled to receive.  It is recommended  that you delay having your steroid injection if you have received a flu shot or shingles vaccine within 2 weeks.       Medrol Dose Pack (Prednisone Taper) has been prescribed today for your acute pain.  Please follow the directions on package.  Do not take with NSAIDs (ibuprofen, aleve, nabumetone, diclofenac).  OK to take with tylenol (extra strength or extended release/8hour) over the counter as needed.      POSSIBLE STEROID SIDE EFFECTS :  -If you experience these, it should only last for a short period-   Swelling   Increased appetite   Skin redness (flushness)   Skin rash   Mouth (oral) irritation   Blood sugar (glucose) levels   Sweats   Mood changes       Robaxin 500mg (muscle relaxant medication) has been prescribed today. Please take 1-2 tabs 4 x daily as needed. This medication may cause drowsiness. Please do not work or drive while taking this medication until you know how it affects you. If it does make you drowsy, you should only take it before bedtime or at times that you do not have to work/drive.     Continue your PT    ~Please call our [facility] Nurse Navigation line [phone] with any questions or concerns about your treatment plan, if symptoms worsen and you would like to be seen urgently, or if you have any new or worsening numbness, weakness, or problems controlling bladder and bowel function.  ~You are also welcome to contact Kasia Galloway via [employer], but please be aware that responses to [employer] message may take 2-3 days due to the high volume of patients seen in clinic.

## 2024-04-29 ENCOUNTER — OFFICE VISIT (OUTPATIENT)
Dept: FAMILY MEDICINE | Facility: CLINIC | Age: 29
End: 2024-04-29
Payer: COMMERCIAL

## 2024-04-29 ENCOUNTER — THERAPY VISIT (OUTPATIENT)
Dept: PHYSICAL THERAPY | Facility: CLINIC | Age: 29
End: 2024-04-29
Attending: NURSE PRACTITIONER
Payer: COMMERCIAL

## 2024-04-29 ENCOUNTER — TELEPHONE (OUTPATIENT)
Dept: NEUROSURGERY | Facility: CLINIC | Age: 29
End: 2024-04-29

## 2024-04-29 VITALS
BODY MASS INDEX: 30.92 KG/M2 | RESPIRATION RATE: 20 BRPM | HEART RATE: 78 BPM | DIASTOLIC BLOOD PRESSURE: 78 MMHG | HEIGHT: 70 IN | TEMPERATURE: 97.3 F | WEIGHT: 216 LBS | SYSTOLIC BLOOD PRESSURE: 130 MMHG | OXYGEN SATURATION: 98 %

## 2024-04-29 DIAGNOSIS — M54.16 LUMBAR RADICULOPATHY: Primary | ICD-10-CM

## 2024-04-29 DIAGNOSIS — M54.50 LUMBAR BACK PAIN: Primary | ICD-10-CM

## 2024-04-29 PROCEDURE — 99213 OFFICE O/P EST LOW 20 MIN: CPT | Performed by: FAMILY MEDICINE

## 2024-04-29 PROCEDURE — 97110 THERAPEUTIC EXERCISES: CPT | Mod: GP | Performed by: PHYSICAL THERAPIST

## 2024-04-29 ASSESSMENT — PAIN SCALES - GENERAL: PAINLEVEL: MODERATE PAIN (4)

## 2024-04-29 NOTE — TELEPHONE ENCOUNTER
Phone call to patient to inform him that follow up is usually 2-3 weeks post injection. Stated understanding. Wanting to get this scheduled. Transferred to scheduling to make appointment.     36.9

## 2024-04-29 NOTE — PROGRESS NOTES
"   04/29/24 0500   Appointment Info   Signing clinician's name / credentials Joanne Colvin, PT   Visits Used 8   Medical Diagnosis Lumbar radiculopathy   PT Tx Diagnosis lumbar radiculopathy   Progress Note/Certification   Therapy Frequency 1X week   Predicted Duration 6 weeks   Progress Note Due Date 06/10/24   Progress Note Completed Date 04/29/24   GOALS   PT Goals 2;3;4   PT Goal 1   Goal Identifier 1.   Goal Description Pt will be able to sit X 30 min w/ LBP no > 2/10   Goal Progress 4/17/24 able to do 30 min then pain 2-3/10.  4/29/24  sitting tolerance 15 min   Target Date 05/20/24   PT Goal 2   Goal Identifier 2.   Goal Description Pt will be able to bend w/ ADL's / light cleaning tasks w/ LBP no > 2/10   Goal Progress 4/17/24 will note tightness --ok to bend to put on shoes but picking up house would increase pain.  4/29/24 avoiding.   Target Date 06/10/24   PT Goal 3   Goal Identifier 3.   Goal Description Pt will be able to lift/ carry 40-50# w/ LBP no > 2/10 for household and work tasks   Goal Progress 4/29/24 ongoing goal   Target Date 06/10/24   PT Goal 4   Goal Identifier 4.   Goal Description Pt will be independent and consistent w/ HEP to manage symptoms and have increased awareness of body mechanics   Goal Progress 4/29/24 not able to do program due to reinjury.   Target Date 06/10/24   Subjective Report   Subjective Report Pt states on  4/25/24 he bent and twisted to pick something up which reinjured his back.   Pt has been lying down most of the time and has a new steriod dose pack.  Pt to have AVERY on 5/13/24.  Pt seeing primary care re: RTW restrictions today.  pain 4/10 in LB  Pt has been using ice/ tens.   Objective Measures    LROM flex 60% (3\" below knee), ext 20%    PPU's 25%    Neg ERS/ FRS L3-5    Sitting w/ weight shifted to L.   Treatment Interventions (PT)   Interventions Therapeutic Procedure/Exercise;Therapeutic Activity;Manual Therapy   Therapeutic Procedure/Exercise   Ther Proc 1 " - Details Pt instructed to gradually increase walking.  Prone lying (pain 3-4/10).  KERLINE 2/10 --will do at home.   PPU 25% of full motion.  Prone over 1 pillow w/ hip ext X 5 B.  LTRS.  Piriformis > 90* (gentle stretch).  Bridges x 10.  Seated nerve glide   Skilled Intervention ex to improve mobility/ strength to improve daily function   Patient Response/Progress decreased program due to recent flareup   Neuromuscular Re-education   Neuro Re-ed 1 - Details Grade II-III PA's L3-5   Skilled Intervention to improve mobility   Patient Response/Progress noted soreness @L4/ 5.   Education   Learner/Method Patient;Listening;Reading;Demonstration;Pictures/Video;No Barriers to Learning   Plan   Home program ex per PTRX: pin to phone   Plan  cont 1X/wk X 6 with recent flareup.  work on functional lifting/ ex program   comments Pt continues to work towards goals.         PLAN  Continue therapy per current plan of care.    Beginning/End Dates of Progress Note Reporting Period:  3/19/24 to 04/29/2024    Referring Provider:  Kasia Galloway

## 2024-04-29 NOTE — PROGRESS NOTES
Assessment & Plan     Lumbar back pain  -- Patient with another recent flareup 4 days ago.  Was evaluated by neurosurgery on 4/26, prescribed a Medrol Dosepak and Robaxin.  It was determined next step is to proceed with epidural steroid injection.  He is scheduled for this on 5/13. He will follow up with Neurosurgery after this injection.   -- Patient reports that his work is unable to accommodate him with restrictions.  Neurosurgery stated patient to be off for the month of April for rehab.  -- Will provide work note today for patient to be able to return to work with restrictions of no repetitive twisting turning or bending.  He states that they were not able to accommodate this in the past.  -- Patient to follow-up with neurosurgery after his steroid injection for when able to return to work, his restrictions with work etc.      The risks, benefits and treatment options of prescribed medications or other treatments have been discussed with the patient. The patient verbalized their understanding and should call or follow up if no improvement or if they develop further problems.      Jose De La Torre is a 28 year old, presenting for the following health issues:  ER F/U        4/29/2024    11:04 AM   Additional Questions   Roomed by Jenni JONES         4/29/2024   Forms   Any forms needing to be completed Yes     HPI     ED/UC Followup:    Facility:  [facility]  Date of visit: 2/27/2024  Reason for visit: Acute midline thoracic back pain  Current Status: has had another flare up Thursday night, needs a note for work.      Has met with Neurosurgery.   Plans to proceed with a TF AVERY L4-5 as a next step, scheduled for 5/13  Has been going to physical therapy.     Continues to have issues with forward flexion and extension.   Company was not able to accommodate him to work with restrictions ( per patient report)    Reports was doing well with physical therapy and had been improving.   Had  "an event on 4/25/2023 where he turned and had another flare up of low back pain. He had some shooting pains down his bilateral legs when evaluated by neurosurgery. Started on medrol dose pack and no longer having any radicular symptoms.    This was quite painful and laid him up for for the last 3 days. His symptoms have been improving though.     Currently on medrol dose pack.   Utilizing robaxin as needed for muscle spasms.   Has been doing physical therapy.       MRI 3/8/2024  IMPRESSION:  1.  Small, broad-based central and right paracentral L4-L5 disc protrusion.    Patient is scheduled for an epidural steroid injection on 5/13/2024.  He will follow-up with neurosurgery after this.          Objective    /78 (BP Location: Right arm, Patient Position: Chair, Cuff Size: Adult Regular)   Pulse 78   Temp 97.3  F (36.3  C) (Oral)   Resp 20   Ht 1.775 m (5' 9.88\")   Wt 98 kg (216 lb)   SpO2 98%   BMI 31.10 kg/m    Body mass index is 31.1 kg/m .  Physical Exam   General: No acute distress  MSK back: Nontender over the thoracic spine.  Mild tenderness over the lumbar spine and lumbar paraspinal musculature.  Range of motion is limited in forward flexion, extension, sidebending and rotation due to discomfort.  Lower extremity strength full.  Lower extremity sensation intact.  Modified straight leg raise testing negative bilaterally.    Signed Electronically by: Smith Coronado DO    "

## 2024-04-29 NOTE — LETTER
April 29, 2024      Wil Hankins  [address] [address]        To Whom It May Concern:    Wil Hankins  was seen on 4/29/2024. Due to medical conditions, can return to work with restrictions of no repetitive twisting or bending motions. Patient to have medical procedure on 5/13, and follow up with medical specialist after this to determine when able to return to work without restrictions.           Sincerely,        Smith Coronado, DO

## 2024-04-29 NOTE — TELEPHONE ENCOUNTER
Other: pt of Kasia Galloway is asking how soon f/u visit should be scheduled after the inj with Dr. Jordan?       Could we send this information to you in [place] or would you prefer to receive a phone call?:   Patient would prefer a phone call   Okay to leave a detailed message?: Yes at Cell number on file:    Telephone Information:   Mobile [phone]

## 2024-04-29 NOTE — PATIENT INSTRUCTIONS
Continue on the medrol dose pack.   Proceed with Injection of the back.     Call neurosurgery and get an appt for after the injection.   Neurosurgery to determine work restrictions and when able to return to work.

## 2024-05-03 NOTE — TELEPHONE ENCOUNTER
Form completed, signed, and faxed to [facility] at [phone]. Copy of form sent to scan and copy placed in cabinet.

## 2024-05-07 ENCOUNTER — RADIOLOGY INJECTION OFFICE VISIT (OUTPATIENT)
Dept: PHYSICAL MEDICINE AND REHAB | Facility: CLINIC | Age: 29
End: 2024-05-07
Attending: NURSE PRACTITIONER
Payer: COMMERCIAL

## 2024-05-07 VITALS
BODY MASS INDEX: 31.84 KG/M2 | WEIGHT: 215 LBS | HEART RATE: 80 BPM | OXYGEN SATURATION: 97 % | RESPIRATION RATE: 18 BRPM | TEMPERATURE: 98.7 F | HEIGHT: 69 IN | DIASTOLIC BLOOD PRESSURE: 72 MMHG | SYSTOLIC BLOOD PRESSURE: 110 MMHG

## 2024-05-07 DIAGNOSIS — M54.16 LUMBAR RADICULOPATHY: ICD-10-CM

## 2024-05-07 PROCEDURE — 64483 NJX AA&/STRD TFRM EPI L/S 1: CPT | Mod: LT | Performed by: STUDENT IN AN ORGANIZED HEALTH CARE EDUCATION/TRAINING PROGRAM

## 2024-05-07 RX ORDER — LIDOCAINE HYDROCHLORIDE 10 MG/ML
INJECTION, SOLUTION EPIDURAL; INFILTRATION; INTRACAUDAL; PERINEURAL
Status: COMPLETED | OUTPATIENT
Start: 2024-05-07 | End: 2024-05-07

## 2024-05-07 RX ORDER — BUPIVACAINE HYDROCHLORIDE 2.5 MG/ML
INJECTION, SOLUTION EPIDURAL; INFILTRATION; INTRACAUDAL
Status: COMPLETED | OUTPATIENT
Start: 2024-05-07 | End: 2024-05-07

## 2024-05-07 RX ORDER — DIPHENHYDRAMINE HYDROCHLORIDE 50 MG/ML
INJECTION INTRAMUSCULAR; INTRAVENOUS
Status: COMPLETED | OUTPATIENT
Start: 2024-05-07 | End: 2024-05-07

## 2024-05-07 RX ORDER — DEXAMETHASONE SODIUM PHOSPHATE 10 MG/ML
INJECTION, SOLUTION INTRAMUSCULAR; INTRAVENOUS
Status: COMPLETED | OUTPATIENT
Start: 2024-05-07 | End: 2024-05-07

## 2024-05-07 RX ADMIN — LIDOCAINE HYDROCHLORIDE 2 ML: 10 INJECTION, SOLUTION EPIDURAL; INFILTRATION; INTRACAUDAL; PERINEURAL at 13:20

## 2024-05-07 RX ADMIN — DEXAMETHASONE SODIUM PHOSPHATE 10 MG: 10 INJECTION, SOLUTION INTRAMUSCULAR; INTRAVENOUS at 13:20

## 2024-05-07 RX ADMIN — DIPHENHYDRAMINE HYDROCHLORIDE 50 MG: 50 INJECTION INTRAMUSCULAR; INTRAVENOUS at 13:36

## 2024-05-07 RX ADMIN — BUPIVACAINE HYDROCHLORIDE 2 ML: 2.5 INJECTION, SOLUTION EPIDURAL; INFILTRATION; INTRACAUDAL at 13:20

## 2024-05-07 ASSESSMENT — PAIN SCALES - GENERAL
PAINLEVEL: MODERATE PAIN (4)
PAINLEVEL: MODERATE PAIN (4)

## 2024-05-07 NOTE — PATIENT INSTRUCTIONS
DISCHARGE INSTRUCTIONS    During office hours (8:00 a.m.- 4:00 p.m.) questions or concerns may be answered  by calling Spine Center Navigation Nurses at  [phone].  Messages received after hours will be returned the following business day.      In the case of an emergency, please dial 911 or seek assistance at the nearest Emergency Room/Urgent Care facility.     All Patients:    You may experience an increase in your symptoms for the first 2 days (It may take anywhere between 2 days- 2 weeks for the steroid to have maximum effect).    You may use ice on the injection site, as frequently as 20 minutes each hour if needed.    You may take your pain medicine.    You may continue taking your regular medication after your injection. If you have had a Medial Branch Block you may resume pain medication once your pain diary is completed.    You may shower. No swimming, tub bath or hot tub for 48 hours.  You may remove your bandaid/bandage as soon as you are home.    You may resume light activities, as tolerated.    Resume your usual diet as tolerated.    It is strongly advised that you do not drive for 1-3 hours post injection.    If you have had oral sedation:  Do not drive for 8 hours post injection.      If you have had IV sedation:  Do not drive for 24 hours post injection.  Do not operate hazardous machinery or make important personal/business decisions for 24 hours.      POSSIBLE STEROID SIDE EFFECTS (If steroid/cortisone was used for your procedure)    -If you experience these symptoms, it should only last for a short period    Swelling of the legs              Skin redness (flushing)     Mouth (oral) irritation   Blood sugar (glucose) levels            Sweats                    Mood changes  Headache  Sleeplessness  Weakened immune system for up to 14 days, which could increase the risk of abby the COVID-19 virus and/or experiencing more severe symptoms of the disease, if exposed.  Decreased  effectiveness of the flu vaccine if given within 2 weeks of the steroid.         POSSIBLE PROCEDURE SIDE EFFECTS  -Call the Spine Center if you are concerned  Increased Pain           Increased numbness/tingling      Nausea/Vomiting          Bruising/bleeding at site      Redness or swelling                                              Difficulty walking      Weakness           Fever greater than 100.5    *In the event of a severe headache after an epidural steroid injection that is relieved by lying down, please call the [facility] to speak with a clinical staff member*

## 2024-05-08 ENCOUNTER — THERAPY VISIT (OUTPATIENT)
Dept: PHYSICAL THERAPY | Facility: CLINIC | Age: 29
End: 2024-05-08
Attending: NURSE PRACTITIONER
Payer: COMMERCIAL

## 2024-05-08 DIAGNOSIS — M54.16 LUMBAR RADICULOPATHY: Primary | ICD-10-CM

## 2024-05-08 PROCEDURE — 97110 THERAPEUTIC EXERCISES: CPT | Mod: GP | Performed by: PHYSICAL THERAPIST

## 2024-05-13 ENCOUNTER — THERAPY VISIT (OUTPATIENT)
Dept: PHYSICAL THERAPY | Facility: CLINIC | Age: 29
End: 2024-05-13
Attending: NURSE PRACTITIONER
Payer: COMMERCIAL

## 2024-05-13 DIAGNOSIS — M54.16 LUMBAR RADICULOPATHY: Primary | ICD-10-CM

## 2024-05-13 PROCEDURE — 97110 THERAPEUTIC EXERCISES: CPT | Mod: GP | Performed by: PHYSICAL THERAPIST

## 2024-05-21 ENCOUNTER — THERAPY VISIT (OUTPATIENT)
Dept: PHYSICAL THERAPY | Facility: CLINIC | Age: 29
End: 2024-05-21
Attending: NURSE PRACTITIONER
Payer: COMMERCIAL

## 2024-05-21 DIAGNOSIS — M54.16 LUMBAR RADICULOPATHY: Primary | ICD-10-CM

## 2024-05-21 PROCEDURE — 97530 THERAPEUTIC ACTIVITIES: CPT | Mod: GP | Performed by: PHYSICAL THERAPIST

## 2024-05-21 PROCEDURE — 97110 THERAPEUTIC EXERCISES: CPT | Mod: GP | Performed by: PHYSICAL THERAPIST

## 2024-05-23 ENCOUNTER — TELEPHONE (OUTPATIENT)
Dept: PHYSICAL MEDICINE AND REHAB | Facility: CLINIC | Age: 29
End: 2024-05-23

## 2024-05-23 NOTE — TELEPHONE ENCOUNTER
Date: May 23, 2024    Provider: SELENE     Provider/Other: Kasia Galloway     Reason for out-going call: FOLLOW-UP      Detailed message: patient was schedule with Kasia in [place] for a follow up to return to work. But has been reschedule due to Provider being out. Patient is thomas in [place] on May 29th and is asking if Kasia is available  to call him sometime next week or give him a letter to clear him to return to work. Please advise

## 2024-05-29 ENCOUNTER — OFFICE VISIT (OUTPATIENT)
Dept: FAMILY MEDICINE | Facility: CLINIC | Age: 29
End: 2024-05-29
Payer: COMMERCIAL

## 2024-05-29 ENCOUNTER — OFFICE VISIT (OUTPATIENT)
Dept: PHYSICAL MEDICINE AND REHAB | Facility: CLINIC | Age: 29
End: 2024-05-29
Payer: COMMERCIAL

## 2024-05-29 VITALS
BODY MASS INDEX: 31.38 KG/M2 | HEART RATE: 76 BPM | DIASTOLIC BLOOD PRESSURE: 78 MMHG | WEIGHT: 212.5 LBS | RESPIRATION RATE: 16 BRPM | TEMPERATURE: 98.6 F | SYSTOLIC BLOOD PRESSURE: 117 MMHG | OXYGEN SATURATION: 96 %

## 2024-05-29 VITALS — SYSTOLIC BLOOD PRESSURE: 156 MMHG | DIASTOLIC BLOOD PRESSURE: 85 MMHG | HEART RATE: 87 BPM

## 2024-05-29 DIAGNOSIS — B96.89 ACUTE BACTERIAL SINUSITIS: Primary | ICD-10-CM

## 2024-05-29 DIAGNOSIS — R42 DIZZINESS: ICD-10-CM

## 2024-05-29 DIAGNOSIS — J01.90 ACUTE BACTERIAL SINUSITIS: Primary | ICD-10-CM

## 2024-05-29 DIAGNOSIS — M54.16 LUMBAR RADICULOPATHY: Primary | ICD-10-CM

## 2024-05-29 DIAGNOSIS — R68.83 CHILLS: ICD-10-CM

## 2024-05-29 PROCEDURE — 87635 SARS-COV-2 COVID-19 AMP PRB: CPT | Performed by: STUDENT IN AN ORGANIZED HEALTH CARE EDUCATION/TRAINING PROGRAM

## 2024-05-29 PROCEDURE — 99213 OFFICE O/P EST LOW 20 MIN: CPT | Performed by: STUDENT IN AN ORGANIZED HEALTH CARE EDUCATION/TRAINING PROGRAM

## 2024-05-29 PROCEDURE — 99213 OFFICE O/P EST LOW 20 MIN: CPT | Performed by: NURSE PRACTITIONER

## 2024-05-29 ASSESSMENT — PAIN SCALES - GENERAL
PAINLEVEL: NO PAIN (0)
PAINLEVEL: NO PAIN (0)

## 2024-05-29 NOTE — LETTER
May 29, 2024      Wil Hankins  [address] [address] [address]        To Whom It May Concern:    Wil Hankins  was seen on 05/29/24.  Please excuse him  until 06/03/2024 due to illness.        Sincerely,        Rosemarie Allen MD

## 2024-05-29 NOTE — PROGRESS NOTES
Assessment & Plan     Acute bacterial sinusitis  Dizziness  Chills  Will treat patient with supportive and symptomatic measures for sinusitis at this time which include: Fluids, rest, over-the-counter medications: decongestants, antihistamines,  and expectorants, side effects of medications reviewed.   I discussed with patient that I suspect their symptoms are related to a bacterial sinusitis, thus prescription sent, side effects of medications reviewed. Educated patient if no improvement on the antibiotics to consider alternative sinusitis such as viral or allergic and other treatment modalities  Additionally we discussed if symptoms do not improve after starting today's treatment (or if symptoms worsen) to follow up in 5-7 days.   - amoxicillin-clavulanate (AUGMENTIN) 875-125 MG tablet  Dispense: 14 tablet; Refill: 0  - Symptomatic COVID-19 Virus (Coronavirus) by PCR Nose     23 minutes spent by me on the date of the encounter doing chart review, history and exam, documentation and further activities per the note. Billed based on complexity of care.     No follow-ups on file.    Rosemarie Allen MD  [facility] is a 29 year old male who presents to clinic today for the following health issues:  Chief Complaint   Patient presents with    Cough     Ongoing for 3x days, runny nose, warm to touch, vomiting since Monday   Pt having sweats and fatigued, weakness- numbness in lips and hands, dizziness, loss of appetite   Pt has tried mucinex, nyquil-  some relief      HPI    Shortness of breath, lips and hands have been going numb, dizziness and weakness, sweating. Symptoms on Sunday, has been in bed since Monday. Difficulty with swallowing, painful to swallow.     URI Adult    Onset of symptoms was 4 day(s) ago.  Course of illness is waxing and waning.    Severity moderate  Current and Associated symptoms: chills, sweats, runny nose, stuffy nose, cough - productive,  wheezing, shortness of breath, sore throat, hoarse voice, headache, body aches, fatigue, nausea, and vomiting  Treatment measures tried include Mucinex, Nyquil, albuterol inhaler- lasted for an hour or two.  Predisposing factors include ill contact: Family member  and exposure to smoke.    Review of Systems  Constitutional, HEENT, cardiovascular, pulmonary, gi and gu systems are negative, except as otherwise noted.      Objective    /78 (BP Location: Right arm, Patient Position: Sitting, Cuff Size: Adult Large)   Pulse 76   Temp 98.6  F (37  C) (Oral)   Resp 16   Wt 96.4 kg (212 lb 8 oz)   SpO2 96%   BMI 31.38 kg/m    Physical Exam   GENERAL: alert and mild distress, profusely sweating  EYES: Eyes grossly normal to inspection, PERRL and conjunctivae and sclerae normal  HENT: normal cephalic/atraumatic, ear canals and TM's normal, nose and mouth without ulcers or lesions, nasal mucosa edematous , rhinorrhea yellow, green, and thick, oropharynx clear, oral mucous membranes moist, and sinuses: not tender, maxillary swelling on both sides  NECK: no adenopathy, no asymmetry, masses, or scars  RESP: lungs clear to auscultation - no rales, rhonchi or wheezes  CV: regular rate and rhythm, normal S1 S2, no S3 or S4, no murmur, click or rub, no peripheral edema  MS: no gross musculoskeletal defects noted, no edema  SKIN: no suspicious lesions or rashes
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
## 2024-05-29 NOTE — PATIENT INSTRUCTIONS
"Radicular Pain    Radicular pain in either the arm or leg is usually from a bulging disc in the spine. A portion of the herniated disc may press against the nerves as the nerves exit the spine. This causes pain which is felt down the arm or leg. Other causes of radicular pain may include:  Fractures.  Heart disease.  Cancer.  An abnormal and usually degenerative state of the nervous system or nerves (neuropathy).    In most cases, radicular pain is treated without imaging unless symptoms do not start to improve. If that is the case, your provider may order a CT or MRI scan to determine the cause.     Nerves in the cervical spine (neck) may cause radicular pain into the outer shoulder and down the arm. It can spread down to the thumb and fingers. The symptoms vary depending on which nerve root has been affected. In most cases, radicular pain improves with conservative treatment such as physical therapy, cervical traction, medications, and epidural steroid injections. A program for neck rehabilitation with stretching and strengthening exercises is an important part of management. Treatment may take months, and surgery may be considered as a last resort if the symptoms do not improve.    Nerves in the lumbar spine (lower back) may cause radicular pain into the hip and down the leg. The commonly used term for this type of pain is \"sciatica\". Conservative treatment is also recommended for this problem. Most patients feel better after 2 to 4 weeks of rest and other supportive care. You should avoid bending, lifting, and all other activities which can make your pain worse. Physical therapy, traction, medications, and epidural steroid injections can be good options to help with your recovery. A program for back injury rehabilitation with stretching and strengthening exercises is an important part of management. Surgery may be considered as a last resort if symptoms do not improve with conservative treatment.     You may " take over-the-counter or prescription medicines for pain, discomfort, or fever as directed by your caregiver. Muscle relaxants may help by relieving more severe pain and spasm. Neuropathic medication (such as gabapentin, lyrica, or cymbalta) can help decrease your symptoms of nerve pain as well. Cold or massage can also give significant relief. Spinal manipulation is not recommended as it can increase the degree of disc protrusion. We do not recommend taking narcotic medication such as percocet, oxycodone, norco, dilaudid, or others unless pain is severe and not controlled with any other oral options.    Epidural steroid injections are often effective treatment for radicular pain. These injections deliver strong anti-inflammatory medicine to the area directly around the nerve root in the space between your back bones (vertebrae). Your provider can give you more information about steroid injections. These injections are most effective when given within two weeks of the onset of acute pain. You should see your provider for follow up care as recommended.     In most cases, radicular pain is treated without imaging unless symptoms do not start to improve. If that is the case, your provider may order a CT or MRI scan to determine the cause.     SEEK IMMEDIATE MEDICAL CARE IF:  You develop increased pain, weakness, or numbness in your arm or leg.  You develop difficulty with bladder or bowel control.  You develop abdominal pain.    Document Released: 01/25/2006 Document Revised: 03/11/2013 Document Reviewed: 04/12/2010  Patient Information  2013 [employer].       If pain returns to greater degree can resume your robaxin (methocarbamol) and EITHER celebrex OR naproxen.       Recommend ongoing PT exercises on a daily basis to help prevent future episodes of pain       We can potentially repeat lumbar epidural injections up to 4x per year as needed. Please call us if symptoms return and remain severe despite medications  and PT exercises      ~Please call our [facility] Nurse Navigation line [phone] with any questions or concerns about your treatment plan, if symptoms worsen and you would like to be seen urgently, or if you have any new or worsening numbness, weakness, or problems controlling bladder and bowel function.  ~You are also welcome to contact Kasia Galloway via [employer], but please be aware that responses to [employer] message may take 2-3 days due to the high volume of patients seen in clinic.

## 2024-05-29 NOTE — LETTER
May 29, 2024      Wil Hankins  [address] [address] [id number]        To Whom It May Concern:    Wil Hankins was seen in our clinic. He may return to work without restrictions as of 5/30/24.      Sincerely,          Kasia BARAJAS  [facility]  O. [phone]
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
## 2024-05-30 LAB — SARS-COV-2 RNA RESP QL NAA+PROBE: NEGATIVE

## 2024-06-26 NOTE — PROGRESS NOTES
"   05/21/24 0500   Appointment Info   Signing clinician's name / credentials Joannemy Colvin, PT   Visits Used 11   Medical Diagnosis Lumbar radiculopathy   PT Tx Diagnosis lumbar radiculopathy   GOALS   PT Goals 2;3;4   PT Goal 1   Goal Identifier 1.   Goal Description Pt will be able to sit X 30 min w/ LBP no > 2/10   Goal Progress 4/17/24 able to do 30 min then pain 2-3/10.  4/29/24  sitting tolerance 15 min 5/21/24 30 min  pain level 2-3/10   Target Date 05/20/24   PT Goal 2   Goal Identifier 2.   Goal Description Pt will be able to bend w/ ADL's / light cleaning tasks w/ LBP no > 2/10   Goal Progress 4/17/24 will note tightness --ok to bend to put on shoes but picking up house would increase pain.  4/29/24 avoiding. 5/21/23 2-3/10   Target Date 06/10/24   PT Goal 3   Goal Identifier 3.   Goal Description Pt will be able to lift/ carry 40-50# w/ LBP no > 2/10 for household and work tasks   Goal Progress 4/29/24 ongoing goal.5/21/24 very focused on any attempt to lift.   Target Date 06/10/24   PT Goal 4   Goal Identifier 4.   Goal Description Pt will be independent and consistent w/ HEP to manage symptoms and have increased awareness of body mechanics   Goal Progress 4/29/24 not able to do program due to reinjury.   Target Date 06/10/24   Date Met 05/21/24   Subjective Report   Subjective Report Pt hopes to be released back to work on 5/24/24.   Pt reports no nerve pain just mm pain.  Pt is using ice, tens and inversion table.  Pain level today 2-3/10--notes this is a constant level.  No leg pain.  no bowel / bladder problems.   Objective Measures    LROM flex 80%, ext 60-70%    PPU's WNL   Treatment Interventions (PT)   Therapeutic Procedure/Exercise   Ther Proc 1 - Details PPU X 10-- full motion (no change in symptoms).  Prone plank 59\".  Bird dog X 10 B.     Bridges w/ marching--advanced to single leg bridge X 5 B.  Supine TA set w/ slight tilt and marching X 10.  piriformis > 90* w/ foot pull stretch.  " Instructed in and practiced tennis ball on wall and theracane for mm tightness.   Skilled Intervention ex to improve mobility/ strength to improve daily function   Patient Response/Progress gradually increasing program   Therapeutic Activity   Ther Act 1 - Details Floor to waist lift 10# X 1,  30# X 5,  40# X 5.  Lift and carry 50 feet:  40# X 5.  Pulling w/ dual column 30# x 10   Skilled Intervention functional lifting   Patient Response/Progress No pain complaints w/ tasks.   Education   Learner/Method Patient;Listening;Reading;Demonstration;Pictures/Video;No Barriers to Learning   Plan   Home program ex per PTRX: pin to phone   Plan Pt to cont on own w/HEP.  Discharge from PT   Comments   Comments Progress towards goals as noted above         DISCHARGE  Reason for Discharge: Pt making good progress, planned to continue on own.    Equipment Issued: none    Discharge Plan: Patient to continue home program.    Referring Provider:  Kasia Galloway

## 2024-09-12 ENCOUNTER — TELEPHONE (OUTPATIENT)
Dept: PHYSICAL MEDICINE AND REHAB | Facility: CLINIC | Age: 29
End: 2024-09-12
Payer: COMMERCIAL

## 2024-09-12 DIAGNOSIS — M54.16 LUMBAR RADICULOPATHY: Primary | ICD-10-CM

## 2024-09-12 NOTE — TELEPHONE ENCOUNTER
"PSP:  Kasia Galloway APRN CNP  Last clinic visit:  5/29/24  Reason for call: Request for repeat injection  Clinical information:  Call received from pt inquiring about repeat injections. Pt had B/L L4-5 TFESIs on 5/7/24. Pt states he received % relief of his symptoms until a few days ago when his same symptoms started to return.   Injection requirements reviewed and order placed. Pt does have listed \"iodinated contrast media\" allergy in his chart. Pt states he got \"sneezy\" after last injection with Omnipaque. No swelling, hives, or SOB. Pt states the sneezing was manageable but he was told by someone that he would likely have to take 1-2 Benadryl prior to next injection. Informed pt will update injection provider to confirm how they would like to pre-treat.   Advice given to patient: Informed pt OK to schedule. Transferred to scheduling to make appt.   Provider to address: Contrast dye allergy pre treatment?     "

## 2024-09-16 NOTE — TELEPHONE ENCOUNTER
Called and instructed patient to take 50mg Benadryl 1 hour prior to procedure per Dr. Jordan. Pt stated understanding. Appt note updated.

## 2024-09-25 ENCOUNTER — RADIOLOGY INJECTION OFFICE VISIT (OUTPATIENT)
Dept: PHYSICAL MEDICINE AND REHAB | Facility: CLINIC | Age: 29
End: 2024-09-25
Attending: NURSE PRACTITIONER
Payer: COMMERCIAL

## 2024-09-25 VITALS
HEIGHT: 69 IN | WEIGHT: 215 LBS | DIASTOLIC BLOOD PRESSURE: 74 MMHG | OXYGEN SATURATION: 98 % | RESPIRATION RATE: 16 BRPM | SYSTOLIC BLOOD PRESSURE: 116 MMHG | BODY MASS INDEX: 31.84 KG/M2 | TEMPERATURE: 97.6 F | HEART RATE: 82 BPM

## 2024-09-25 DIAGNOSIS — M54.16 LUMBAR RADICULOPATHY: ICD-10-CM

## 2024-09-25 DIAGNOSIS — M54.6 ACUTE MIDLINE THORACIC BACK PAIN: ICD-10-CM

## 2024-09-25 PROCEDURE — 64483 NJX AA&/STRD TFRM EPI L/S 1: CPT | Mod: 50 | Performed by: STUDENT IN AN ORGANIZED HEALTH CARE EDUCATION/TRAINING PROGRAM

## 2024-09-25 RX ORDER — METHOCARBAMOL 500 MG/1
500-1000 TABLET, FILM COATED ORAL 4 TIMES DAILY PRN
Qty: 40 TABLET | Refills: 0 | Status: SHIPPED | OUTPATIENT
Start: 2024-09-25

## 2024-09-25 RX ORDER — DEXAMETHASONE SODIUM PHOSPHATE 10 MG/ML
INJECTION, SOLUTION INTRAMUSCULAR; INTRAVENOUS
Status: COMPLETED | OUTPATIENT
Start: 2024-09-25 | End: 2024-09-25

## 2024-09-25 RX ORDER — BUPIVACAINE HYDROCHLORIDE 2.5 MG/ML
INJECTION, SOLUTION EPIDURAL; INFILTRATION; INTRACAUDAL
Status: COMPLETED | OUTPATIENT
Start: 2024-09-25 | End: 2024-09-25

## 2024-09-25 RX ORDER — LIDOCAINE HYDROCHLORIDE 10 MG/ML
INJECTION, SOLUTION EPIDURAL; INFILTRATION; INTRACAUDAL; PERINEURAL
Status: COMPLETED | OUTPATIENT
Start: 2024-09-25 | End: 2024-09-25

## 2024-09-25 RX ADMIN — BUPIVACAINE HYDROCHLORIDE 2 ML: 2.5 INJECTION, SOLUTION EPIDURAL; INFILTRATION; INTRACAUDAL at 11:10

## 2024-09-25 RX ADMIN — LIDOCAINE HYDROCHLORIDE 2 ML: 10 INJECTION, SOLUTION EPIDURAL; INFILTRATION; INTRACAUDAL; PERINEURAL at 11:10

## 2024-09-25 RX ADMIN — DEXAMETHASONE SODIUM PHOSPHATE 10 MG: 10 INJECTION, SOLUTION INTRAMUSCULAR; INTRAVENOUS at 11:10

## 2024-09-25 ASSESSMENT — PAIN SCALES - GENERAL
PAINLEVEL: SEVERE PAIN (6)
PAINLEVEL: MODERATE PAIN (5)

## 2024-09-25 NOTE — TELEPHONE ENCOUNTER
Last appointment: 9/25/24  Next appointment: none    Notes/Comments: last ordered 4/26/24 #40 with no refills      Rx request(s) has been reviewed.

## 2024-09-25 NOTE — PATIENT INSTRUCTIONS
DISCHARGE INSTRUCTIONS    During office hours (8:00 a.m.- 4:00 p.m.) questions or concerns may be answered  by calling [facility] Navigation Nurses at  [phone].  Messages received after hours will be returned the following business day.      In the case of an emergency, please dial 911 or seek assistance at the nearest Emergency Room/Urgent Care facility.     All Patients:    You may experience an increase in your symptoms for the first 2 days (It may take anywhere between 2 days- 2 weeks for the steroid to have maximum effect).    You may use ice on the injection site, as frequently as 20 minutes each hour if needed.    You may take your pain medicine.    You may continue taking your regular medication after your injection. If you have had a Medial Branch Block you may resume pain medication once your pain diary is completed.    You may shower. No swimming, tub bath or hot tub for 48 hours.  You may remove your bandaid/bandage as soon as you are home.    You may resume light activities, as tolerated.    Resume your usual diet as tolerated.    If you were told to hold any blood thinning medications you may resume taking them 24 hours after your procedure as prescribed.    It is strongly advised that you do not drive for 1-3 hours post injection.    If you have had oral sedation:  Do not drive for 8 hours post injection.      If you have had IV sedation:  Do not drive for 24 hours post injection.  Do not operate hazardous machinery or make important personal/business decisions for 24 hours.      POSSIBLE STEROID SIDE EFFECTS (If steroid/cortisone was used for your procedure)    -If you experience these symptoms, it should only last for a short period    Swelling of the legs              Skin redness (flushing)     Mouth (oral) irritation   Blood sugar (glucose) levels            Sweats                    Mood changes  Headache  Sleeplessness  Weakened immune system for up to 14 days, which could increase  the risk of abby the COVID-19 virus and/or experiencing more severe symptoms of the disease, if exposed.  Decreased effectiveness of the flu vaccine if given within 2 weeks of the steroid.         POSSIBLE PROCEDURE SIDE EFFECTS  -Call the Spine Center if you are concerned  Increased Pain           Increased numbness/tingling      Nausea/Vomiting          Bruising/bleeding at site      Redness or swelling                                              Difficulty walking      Weakness           Fever greater than 100.5    *In the event of a severe headache after an epidural steroid injection that is relieved by lying down, please call the [facility] to speak with a clinical staff member*

## 2024-09-25 NOTE — CONFIDENTIAL NOTE
Pt had allergic reaction to contrast dye at first injection. Reaction was persistent sneezing. Pre-treated with Benadryl for second injection and still had reaction (persistent sneezing). Pre-treat with Benadryl AND Prednisone for future injections per provider. Patient advised to seek medical care/go to the ER if symptoms worsen or if he develops any shortness of breath, throat/tongue swelling, or difficulty breathing.

## 2025-03-15 ENCOUNTER — HEALTH MAINTENANCE LETTER (OUTPATIENT)
Age: 30
End: 2025-03-15